# Patient Record
Sex: FEMALE | Race: WHITE | URBAN - METROPOLITAN AREA
[De-identification: names, ages, dates, MRNs, and addresses within clinical notes are randomized per-mention and may not be internally consistent; named-entity substitution may affect disease eponyms.]

---
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
## 2021-09-09 ENCOUNTER — APPOINTMENT (RX ONLY)
Dept: URBAN - METROPOLITAN AREA CLINIC 348 | Facility: CLINIC | Age: 21
Setting detail: DERMATOLOGY
End: 2021-09-09

## 2021-09-09 DIAGNOSIS — L71.8 OTHER ROSACEA: ICD-10-CM

## 2021-09-09 PROBLEM — F32.9 MAJOR DEPRESSIVE DISORDER, SINGLE EPISODE, UNSPECIFIED: Status: ACTIVE | Noted: 2021-09-09

## 2021-09-09 PROCEDURE — ? TREATMENT REGIMEN

## 2021-09-09 PROCEDURE — ? PRESCRIPTION

## 2021-09-09 PROCEDURE — 99203 OFFICE O/P NEW LOW 30 MIN: CPT

## 2021-09-09 PROCEDURE — ? COUNSELING

## 2021-09-09 RX ORDER — METRONIDAZOLE 7.5 MG/G
CREAM TOPICAL
Qty: 45 | Refills: 4 | Status: ERX | COMMUNITY
Start: 2021-09-09

## 2021-09-09 RX ADMIN — METRONIDAZOLE: 7.5 CREAM TOPICAL at 00:00

## 2021-09-09 ASSESSMENT — LOCATION ZONE DERM: LOCATION ZONE: FACE

## 2021-09-09 ASSESSMENT — LOCATION DETAILED DESCRIPTION DERM: LOCATION DETAILED: LEFT CENTRAL MALAR CHEEK

## 2021-09-09 ASSESSMENT — LOCATION SIMPLE DESCRIPTION DERM: LOCATION SIMPLE: LEFT CHEEK

## 2021-09-09 NOTE — PROCEDURE: TREATMENT REGIMEN
Initiate Treatment: Metronidazole- apply pea size amount to full face twice daily
Detail Level: Zone

## 2021-09-09 NOTE — HPI: RASH
How Severe Is Your Rash?: mild
Is This A New Presentation, Or A Follow-Up?: Rash
Additional History: Patient states the rash happens randomly and eventually goes away .

## 2022-03-29 ENCOUNTER — IMPORTED ENCOUNTER (OUTPATIENT)
Dept: URBAN - METROPOLITAN AREA CLINIC 59 | Facility: CLINIC | Age: 22
End: 2022-03-29

## 2022-03-29 PROBLEM — H10.45 OTHER CHRONIC ALLERGIC CONJUNCTIVITIS: Noted: 2022-03-29

## 2022-03-29 PROBLEM — H52.13 MYOPIA, BILATERAL: Noted: 2022-03-29

## 2022-03-29 PROBLEM — G43.819 OTHER MIGRAINE, INTRACTABLE, WITHOUT STATUS MIGRAINOSUS: Noted: 2022-03-29

## 2022-03-29 PROCEDURE — 92015 DETERMINE REFRACTIVE STATE: CPT

## 2022-03-29 PROCEDURE — 99213 OFFICE O/P EST LOW 20 MIN: CPT

## 2023-10-21 ASSESSMENT — VISUAL ACUITY
OD_SC: 20/20-2
OS_SC: 20/25
OS_CC: 20/20
OU_SC: 20/20
OD_CC: 20/30-2
OS_CC: 20/20-1
OD_CC: 20/20-2

## 2024-07-24 ENCOUNTER — ESTABLISHED COMPREHENSIVE EXAM (OUTPATIENT)
Dept: URBAN - METROPOLITAN AREA CLINIC 22 | Facility: CLINIC | Age: 24
End: 2024-07-24

## 2024-07-24 DIAGNOSIS — G43.819: ICD-10-CM

## 2024-07-24 DIAGNOSIS — H10.45: ICD-10-CM

## 2024-07-24 DIAGNOSIS — H40.013: ICD-10-CM

## 2024-07-24 PROCEDURE — 92014 COMPRE OPH EXAM EST PT 1/>: CPT

## 2024-07-24 PROCEDURE — 92133 CPTRZD OPH DX IMG PST SGM ON: CPT

## 2024-07-24 ASSESSMENT — VISUAL ACUITY
OU_SC: J1
OS_SC: 20/20
OD_SC: 20/20

## 2025-08-06 ENCOUNTER — ESTABLISHED COMPREHENSIVE EXAM (OUTPATIENT)
Dept: URBAN - METROPOLITAN AREA CLINIC 59 | Facility: CLINIC | Age: 25
End: 2025-08-06

## 2025-08-06 DIAGNOSIS — H10.45: ICD-10-CM

## 2025-08-06 DIAGNOSIS — G43.819: ICD-10-CM

## 2025-08-06 DIAGNOSIS — H40.013: ICD-10-CM

## 2025-08-06 PROCEDURE — 92014 COMPRE OPH EXAM EST PT 1/>: CPT

## 2025-08-06 PROCEDURE — 92133 CPTRZD OPH DX IMG PST SGM ON: CPT

## 2025-08-06 ASSESSMENT — VISUAL ACUITY
OS_CC: 20/20
OU_CC: 20/20
OD_CC: 20/25+2